# Patient Record
Sex: FEMALE | Race: BLACK OR AFRICAN AMERICAN | NOT HISPANIC OR LATINO | Employment: FULL TIME | URBAN - METROPOLITAN AREA
[De-identification: names, ages, dates, MRNs, and addresses within clinical notes are randomized per-mention and may not be internally consistent; named-entity substitution may affect disease eponyms.]

---

## 2024-02-26 ENCOUNTER — OFFICE VISIT (OUTPATIENT)
Dept: OPHTHALMOLOGY | Facility: CLINIC | Age: 34
End: 2024-02-26
Payer: COMMERCIAL

## 2024-02-26 DIAGNOSIS — Z02.1 PRE-EMPLOYMENT HEALTH SCREENING EXAMINATION: Primary | ICD-10-CM

## 2024-02-26 PROCEDURE — LSREX EMPLOYEE LASER EYE EXAM: Performed by: OPTOMETRIST

## 2024-02-26 RX ORDER — CETIRIZINE HYDROCHLORIDE 10 MG/1
10 TABLET ORAL
COMMUNITY
Start: 2017-02-26

## 2024-02-26 RX ORDER — FERROUS SULFATE 325(65) MG
1 TABLET ORAL
COMMUNITY
Start: 2023-11-29

## 2024-02-26 RX ORDER — EPINEPHRINE 0.3 MG/.3ML
INJECTION SUBCUTANEOUS
COMMUNITY
Start: 2013-05-15

## 2024-02-26 RX ORDER — ALBUTEROL SULFATE 90 UG/1
AEROSOL, METERED RESPIRATORY (INHALATION)
COMMUNITY
Start: 2015-09-22

## 2024-02-26 RX ORDER — PRENATAL VIT 49/IRON FUM/FOLIC 6.75-0.2MG
TABLET ORAL
COMMUNITY

## 2024-02-26 ASSESSMENT — CONF VISUAL FIELD
METHOD: COUNTING FINGERS
OD_SUPERIOR_TEMPORAL_RESTRICTION: 0
OD_SUPERIOR_NASAL_RESTRICTION: 0
OD_NORMAL: 1
OS_SUPERIOR_TEMPORAL_RESTRICTION: 0
OS_INFERIOR_NASAL_RESTRICTION: 0
OD_INFERIOR_NASAL_RESTRICTION: 0
OD_INFERIOR_TEMPORAL_RESTRICTION: 0
OS_SUPERIOR_NASAL_RESTRICTION: 0
OS_INFERIOR_TEMPORAL_RESTRICTION: 0
OS_NORMAL: 1

## 2024-02-26 ASSESSMENT — TONOMETRY
OD_IOP_MMHG: 14
OS_IOP_MMHG: 15
IOP_METHOD: GOLDMANN APPLANATION

## 2024-02-26 ASSESSMENT — VISUAL ACUITY
OS_CC: 20/20
CORRECTION_TYPE: GLASSES
OD_CC: 20/20
METHOD: SNELLEN - LINEAR
OD_CC+: -2

## 2024-02-26 ASSESSMENT — ENCOUNTER SYMPTOMS
NEUROLOGICAL NEGATIVE: 0
EYES NEGATIVE: 0
MUSCULOSKELETAL NEGATIVE: 0
ENDOCRINE NEGATIVE: 0
RESPIRATORY NEGATIVE: 0
CARDIOVASCULAR NEGATIVE: 0
ALLERGIC/IMMUNOLOGIC NEGATIVE: 0
PSYCHIATRIC NEGATIVE: 0
HEMATOLOGIC/LYMPHATIC NEGATIVE: 0
GASTROINTESTINAL NEGATIVE: 0
CONSTITUTIONAL NEGATIVE: 0

## 2024-02-26 ASSESSMENT — SLIT LAMP EXAM - LIDS
COMMENTS: NORMAL
COMMENTS: NORMAL

## 2024-02-26 ASSESSMENT — CUP TO DISC RATIO
OD_RATIO: 0.3
OS_RATIO: 0.3

## 2024-02-26 ASSESSMENT — EXTERNAL EXAM - LEFT EYE: OS_EXAM: NORMAL

## 2024-02-26 ASSESSMENT — EXTERNAL EXAM - RIGHT EYE: OD_EXAM: NORMAL

## 2024-02-26 NOTE — PROGRESS NOTES
Assessment/Plan   Diagnoses and all orders for this visit:  Pre-employment health screening examination  [employer]. Ocular health WNL OU. 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 Monitor prn.

## 2024-04-28 ENCOUNTER — HOSPITAL ENCOUNTER (EMERGENCY)
Facility: HOSPITAL | Age: 34
Discharge: HOME | End: 2024-04-28
Payer: COMMERCIAL

## 2024-04-28 VITALS
RESPIRATION RATE: 16 BRPM | SYSTOLIC BLOOD PRESSURE: 115 MMHG | OXYGEN SATURATION: 99 % | HEART RATE: 76 BPM | DIASTOLIC BLOOD PRESSURE: 79 MMHG
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
## 2024-04-28 PROCEDURE — 99281 EMR DPT VST MAYX REQ PHY/QHP: CPT

## 2024-04-28 PROCEDURE — 99283 EMERGENCY DEPT VISIT LOW MDM: CPT | Performed by: PHYSICIAN ASSISTANT

## 2024-04-28 ASSESSMENT — COLUMBIA-SUICIDE SEVERITY RATING SCALE - C-SSRS
1. IN THE PAST MONTH, HAVE YOU WISHED YOU WERE DEAD OR WISHED YOU COULD GO TO SLEEP AND NOT WAKE UP?: NO
2. HAVE YOU ACTUALLY HAD ANY THOUGHTS OF KILLING YOURSELF?: NO
6. HAVE YOU EVER DONE ANYTHING, STARTED TO DO ANYTHING, OR PREPARED TO DO ANYTHING TO END YOUR LIFE?: NO

## 2024-04-28 NOTE — ED PROVIDER NOTES
Emergency Department Encounter  [facility] EMERGENCY MEDICINE    Patient: Amelia Zendejas  MRN: [id number]  : 1990  Date of Evaluation: 2024  ED Provider: Awilda Anderson PA-C      Chief Complaint       Chief Complaint   Patient presents with    Body Fluid Exposure     HPI    Amelia Zendejas is a 33 y.o. female who presents to the emergency department presenting for needlestick injury.  Patient states that she was stuck with a suture needle just prior to arrival to her left second digit.  She thoroughly cleaned the area prior to arrival.  Reports that the source patient is now on the floor and is available for lab draw.  Denies any known exposures from the source patient.  No associated numbness, tingling, weakness.  Reports her tetanus shot was within the last 10 years.  Is declining empiric postexposure prophylaxis today.    ROS:     Review of Systems  14 systems reviewed and otherwise acutely negative except as in the HPI.    Past History     Past Medical History:   Diagnosis Date    Allergy status to unspecified drugs, medicaments and biological substances 05/15/2013    History of allergy    Chlamydial infection, unspecified 10/07/2014    Chlamydia    Encounter for supervision of other normal pregnancy, unspecified trimester ([facility]) 10/02/2014    Pregnancy, subsequent    Hordeolum externum unspecified eye, unspecified eyelid 2014    Stye    Irregular menstruation, unspecified 10/02/2014    Missed period    Nausea 2013    Nausea    Other problems related to lifestyle 10/02/2014    Other problems related to lifestyle    Personal history of other diseases of the female genital tract 2013    History of vaginitis    Personal history of other diseases of the respiratory system 2013    History of pharyngitis     No past surgical history on file.  Social History     Socioeconomic History    Marital status:      Spouse name: Not on  file    Number of children: Not on file    Years of education: Not on file    Highest education level: Not on file   Occupational History    Not on file   Tobacco Use    Smoking status: Not on file    Smokeless tobacco: Not on file   Substance and Sexual Activity    Alcohol use: Not on file    Drug use: Not on file    Sexual activity: Not on file   Other Topics Concern    Not on file   Social History Narrative    Not on file     Social Determinants of Health     Financial Resource Strain: Not on File (3/10/2023)    Received from [employer]     Financial Resource Strain     Financial Resource Strain: 0   Food Insecurity: Not on File (3/10/2023)    Received from [employer]     Food Insecurity     Food: 0   Transportation Needs: Not on File (3/10/2023)    Received from [employer]     Transportation Needs     Transportation: 0   Physical Activity: Not on File (3/10/2023)    Received from [employer]     Physical Activity     Physical Activity: 0   Stress: Not on File (3/10/2023)    Received from [employer]     Stress     Stress: 0   Social Connections: Not on File (3/10/2023)    Received from [employer]     Social Connections     Social Connections and Isolation: 0   Intimate Partner Violence: Not on file   Housing Stability: Not on File (3/10/2023)    Received from [employer]     Housing Stability     [place]ous[place]       Medications/Allergies     Previous Medications    ALBUTEROL (PROAIR HFA) 90 MCG/ACTUATION INHALER    Inhale.    CETIRIZINE (ZYRTEC) 10 MG TABLET    Take 1 tablet (10 mg) by mouth once daily.    EPINEPHRINE 0.3 MG/0.3 ML INJECTION SYRINGE    INJECT INTRAMUSCULARLY AS DIRECTED.    FERROUS SULFATE, 325 MG FERROUS SULFATE, TABLET    Take 1 tablet by mouth once daily with breakfast.    PRENATAL VIT 49-IRON FUM-FOLIC (MINI PRENATAL) 6.75 MG IRON- 200 MCG TABLET    Take by mouth.     Allergies   Allergen Reactions    Fish Containing Products Anaphylaxis    Peanut Anaphylaxis    Peanut Oil Anaphylaxis    Shellfish Derived Hives and Rash         Physical Exam       ED Triage Vitals   Temp Pulse Resp BP   -- -- -- --      SpO2 Temp src Heart Rate Source Patient Position   -- -- -- --      BP Location FiO2 (%)     -- --         Physical Exam    Physical Exam:    VS: As documented in the triage note and EMR flowsheet from this visit were reviewed.    Appearance: Alert, oriented, cooperative, in no acute distress. Well nourished & well hydrated.    Skin: Warm, intact and dry. No lesions, rash, or petechiae. No streaking from wound site.    Neck: Supple.    Pulmonary: Clear bilaterally with good chest wall excursion. No rales, rhonchi or wheezing. No accessory muscle use or stridor.     Cardiac: Normal S1, S2     Musculoskeletal: Spontaneously moving all extremities without limitation. Extremities warm and well-perfused, capillary refill less than 2 seconds. Pulses full and equal. Full AROM left fingers, wrist.     Neurological: Normal sensation, no weakness. Equal  strength.    Psychiatric: Appropriate mood and affect. Kempt appearance.     Diagnostics   Not applicable      ED Course   There were no vitals taken for this visit.  Medications - No data to display    Medical Decision Making     Diagnoses as of 04/28/24 1131   Needlestick injury accident with exposure to body fluid   Patient presents with benign needlestick to left second digit.  She is declining postexposure prophylaxis.  Needlestick paperwork is filled out and faxed to [employer].  She is instructed to follow-up with employee health tomorrow, at her earliest convenience.  Tetanus has been updated within the past 10 years per the patient, this is deferred today.      Final Impression      1. Needlestick injury accident with exposure to body fluid          DISPOSITION  Disposition: discharge  Patient condition is: Stable    Comment: Please note this report has been produced using speech recognition software and may contain errors related to that system including errors in grammar,  punctuation, and spelling, as well as words and phrases that may be inappropriate.  If there are any questions or concerns please feel free to contact the dictating provider for clarification.    HERNANDEZ Lai PA-C  04/28/24 1136

## 2025-03-30 ENCOUNTER — APPOINTMENT (OUTPATIENT)
Dept: RADIOLOGY | Facility: HOSPITAL | Age: 35
End: 2025-03-30
Payer: COMMERCIAL

## 2025-03-30 ENCOUNTER — HOSPITAL ENCOUNTER (EMERGENCY)
Facility: HOSPITAL | Age: 35
Discharge: HOME | End: 2025-03-30
Payer: COMMERCIAL

## 2025-03-30 VITALS
HEART RATE: 60 BPM | RESPIRATION RATE: 16 BRPM | SYSTOLIC BLOOD PRESSURE: 125 MMHG | TEMPERATURE: 96.3 F | DIASTOLIC BLOOD PRESSURE: 79 MMHG | HEIGHT: 63 IN | BODY MASS INDEX: 27.46 KG/M2 | OXYGEN SATURATION: 100 % | WEIGHT: 155 LBS

## 2025-03-30 DIAGNOSIS — Z34.91 FIRST TRIMESTER PREGNANCY (HHS-HCC): Primary | ICD-10-CM

## 2025-03-30 DIAGNOSIS — O26.899 ABDOMINAL PAIN AFFECTING PREGNANCY (HHS-HCC): ICD-10-CM

## 2025-03-30 DIAGNOSIS — R10.9 ABDOMINAL PAIN AFFECTING PREGNANCY (HHS-HCC): ICD-10-CM

## 2025-03-30 LAB
APPEARANCE UR: CLEAR
B-HCG SERPL-ACNC: ABNORMAL MIU/ML
BILIRUB UR STRIP.AUTO-MCNC: NEGATIVE MG/DL
COLOR UR: COLORLESS
GLUCOSE UR STRIP.AUTO-MCNC: NORMAL MG/DL
HOLD SPECIMEN: NORMAL
KETONES UR STRIP.AUTO-MCNC: NEGATIVE MG/DL
LEUKOCYTE ESTERASE UR QL STRIP.AUTO: NEGATIVE
NITRITE UR QL STRIP.AUTO: NEGATIVE
PH UR STRIP.AUTO: 6 [PH]
PROT UR STRIP.AUTO-MCNC: NEGATIVE MG/DL
RBC # UR STRIP.AUTO: ABNORMAL MG/DL
RBC #/AREA URNS AUTO: NORMAL /HPF
SP GR UR STRIP.AUTO: 1
UROBILINOGEN UR STRIP.AUTO-MCNC: NORMAL MG/DL
WBC #/AREA URNS AUTO: NORMAL /HPF

## 2025-03-30 PROCEDURE — 99284 EMERGENCY DEPT VISIT MOD MDM: CPT

## 2025-03-30 PROCEDURE — 36415 COLL VENOUS BLD VENIPUNCTURE: CPT

## 2025-03-30 PROCEDURE — 76801 OB US < 14 WKS SINGLE FETUS: CPT

## 2025-03-30 PROCEDURE — 76801 OB US < 14 WKS SINGLE FETUS: CPT | Performed by: RADIOLOGY

## 2025-03-30 PROCEDURE — 99284 EMERGENCY DEPT VISIT MOD MDM: CPT | Mod: 25

## 2025-03-30 PROCEDURE — 76817 TRANSVAGINAL US OBSTETRIC: CPT | Performed by: RADIOLOGY

## 2025-03-30 PROCEDURE — 84702 CHORIONIC GONADOTROPIN TEST: CPT

## 2025-03-30 PROCEDURE — 81001 URINALYSIS AUTO W/SCOPE: CPT

## 2025-03-30 ASSESSMENT — PAIN DESCRIPTION - LOCATION: LOCATION: ABDOMEN

## 2025-03-30 ASSESSMENT — PAIN SCALES - GENERAL: PAINLEVEL_OUTOF10: 10 - WORST POSSIBLE PAIN

## 2025-03-30 ASSESSMENT — COLUMBIA-SUICIDE SEVERITY RATING SCALE - C-SSRS
6. HAVE YOU EVER DONE ANYTHING, STARTED TO DO ANYTHING, OR PREPARED TO DO ANYTHING TO END YOUR LIFE?: NO
2. HAVE YOU ACTUALLY HAD ANY THOUGHTS OF KILLING YOURSELF?: NO
1. IN THE PAST MONTH, HAVE YOU WISHED YOU WERE DEAD OR WISHED YOU COULD GO TO SLEEP AND NOT WAKE UP?: NO

## 2025-03-30 ASSESSMENT — PAIN - FUNCTIONAL ASSESSMENT: PAIN_FUNCTIONAL_ASSESSMENT: 0-10

## 2025-03-30 NOTE — Clinical Note
Amelia Zendejas was seen and treated in our emergency department on 3/30/2025.  She may return to work on 03/31/2025.       If you have any questions or concerns, please don't hesitate to call.      Latia Mora PA-C

## 2025-03-30 NOTE — DISCHARGE INSTRUCTIONS
Your hCG was elevated at about 35,000.  Your urine showed no evidence of infection.  The ultrasound we obtained showed an intrauterine pregnancy corresponding to about 6 weeks and 3 days with a fetal heart rate of 117.  This is very reassuring.  Going forward, please reach out to your OB/GYN provider and get an appointment for your first trimester visit in the next few weeks.  If you begin experiencing any worsening pain, headache, dizziness, lightheadedness, syncope, or vaginal bleeding please return immediately.

## 2025-03-30 NOTE — ED PROVIDER NOTES
HPI   Chief Complaint   Patient presents with    Abdominal Pain       Patient is an otherwise healthy  female currently about 8 weeks pregnant with LMP in early February presenting to the ED with abdominal pain.  Patient states she woke up at 4 AM with sharp lower abdominal pain.  She states it is still occurring intermittently.  She denies any vaginal bleeding, but states her symptoms feel similar to her history of a miscarriage.  Patient otherwise states she was in her normal state of health up until her symptoms began at 4 AM.  She denies take anything for her symptoms prior to presenting today.              Patient History   Past Medical History:   Diagnosis Date    Allergy status to unspecified drugs, medicaments and biological substances 05/15/2013    History of allergy    Chlamydial infection, unspecified 10/07/2014    Chlamydia    Encounter for supervision of other normal pregnancy, unspecified trimester ([facility]) 10/02/2014    Pregnancy, subsequent    Hordeolum externum unspecified eye, unspecified eyelid 2014    Stye    Irregular menstruation, unspecified 10/02/2014    Missed period    Nausea 2013    Nausea    Other problems related to lifestyle 10/02/2014    Other problems related to lifestyle    Personal history of other diseases of the female genital tract 2013    History of vaginitis    Personal history of other diseases of the respiratory system 2013    History of pharyngitis     History reviewed. No pertinent surgical history.  No family history on file.  Social History     Tobacco Use    Smoking status: Never    Smokeless tobacco: Never   Substance Use Topics    Alcohol use: Not on file    Drug use: Not on file       Physical Exam   ED Triage Vitals [25 0833]   Temperature Heart Rate Respirations BP   35.7 °C (96.3 °F) 60 16 125/79      Pulse Ox Temp src Heart Rate Source Patient Position   100 % -- -- --      BP Location FiO2 (%)     -- --       Physical  Exam  Vitals reviewed.   Constitutional:       General: She is not in acute distress.     Appearance: She is not ill-appearing.   Cardiovascular:      Rate and Rhythm: Normal rate.   Pulmonary:      Effort: Pulmonary effort is normal. No respiratory distress.      Breath sounds: Normal breath sounds.   Abdominal:      General: Bowel sounds are normal.      Palpations: Abdomen is soft.      Tenderness: There is no abdominal tenderness. There is no guarding or rebound.   Genitourinary:     Comments: Deferred   Neurological:      General: No focal deficit present.      Mental Status: She is alert.           ED Course & MDM   Diagnoses as of 03/30/25 1108   First trimester pregnancy ([facility])   Abdominal pain affecting pregnancy ([facility])     Labs Reviewed   HUMAN CHORIONIC GONADOTROPIN, SERUM QUANTITATIVE - Abnormal       Result Value    HCG, Beta-Quantitative 36,500 (*)     Narrative:     Total HCG measurement is performed using the Siemens WikirinllFarFaria immunoassay which detects intact HCG and free beta HCG subunit.  This test is not indicated for use as a tumor marker.  HCG testing is performed using a different test methodology at [facility] than other [facility]. Direct result comparison  should only be made within the same method.          URINALYSIS WITH REFLEX CULTURE AND MICROSCOPIC - Abnormal    Color, Urine Colorless (*)     Appearance, Urine Clear      Specific Gravity, Urine 1.001 (*)     pH, Urine 6.0      Protein, Urine NEGATIVE      Glucose, Urine Normal      Blood, Urine 0.03 (TRACE) (*)     Ketones, Urine NEGATIVE      Bilirubin, Urine NEGATIVE      Urobilinogen, Urine Normal      Nitrite, Urine NEGATIVE      Leukocyte Esterase, Urine NEGATIVE     URINALYSIS MICROSCOPIC WITH REFLEX CULTURE - Normal    WBC, Urine 1-5      RBC, Urine 1-2     URINALYSIS WITH REFLEX CULTURE AND MICROSCOPIC    Narrative:     The following orders were created for panel order Urinalysis with Reflex Culture  and Microscopic.  Procedure                               Abnormality         Status                     ---------                               -----------         ------                     Urinalysis with Reflex C...[[id number]]  Abnormal            Final result               Extra Urine Gray Tube[[id number]]                            In process                   Please view results for these tests on the individual orders.   EXTRA URINE GRAY TUBE     US PELVIS OB TRANSABDOMINAL W TRANSVAGINAL UP TO 1ST TRIMESTER   Final Result   Single live intrauterine gestation corresponding to 6 weeks, 3 days.   Follow up examination is recommended at 18-20 weeks gestation for   evaluation of fetal anatomy.             I personally reviewed the images/study and I agree with the findings   as stated above by resident physician, Dr. Mir Mcak.        MACRO:   None3        Signed by: Gaston Weaver 3/30/2025 10:25 AM   Dictation workstation:   [id number]                  No data recorded     [place] Coma Scale Score: 15 (25 0841 : Malka Huang RN)                           Medical Decision Making  Patient is an otherwise healthy  female currently about 8 weeks pregnant with LMP in early February presenting to the ED with abdominal pain.  History was obtained from the patient.  Woke up around 4 AM with sharp lower abdominal pain.  Now occurring intermittently.  Denies vaginal bleeding but states her symptoms feel similar to her previous miscarriage.  Otherwise in her normal state of health until the symptoms began.  On physical exam, patient is sitting comfortably and in no apparent distress.  Abdomen soft and nontender with normal bowel sounds.  No rebound or guarding.  Pelvic exam deferred.  Remainder of exam as noted above.  Vital signs are stable.    Differential diagnosis considered includes early pregnancy symptoms, miscarriage, STD, torsion, or TOA.  Quantitative hCG, urinalysis, and  transabdominal transvaginal ultrasound obtained for further evaluation.  Patient declined analgesics.  Urinalysis without evidence of infection.  hCG elevated at 35,500.  Ultrasound revealed a single live IUP corresponding to 6 weeks and 3 days with a fetal heart rate of 117.  Patient was informed these results.  She then remained stable and ready for discharge.  She can take Tylenol as needed for pain.  Otherwise, she states she has an OB/GYN provider with whom she will get in touch with to facilitate first trimester follow-up.  Return precautions otherwise provided.  She is agreeable to the plan and her questions were answered to satisfaction.  She was discharged in stable condition.        Procedure  Procedures     Latia Mora PA-C  03/30/25 1111

## 2025-04-03 ENCOUNTER — APPOINTMENT (OUTPATIENT)
Dept: RADIOLOGY | Facility: HOSPITAL | Age: 35
End: 2025-04-03
Payer: COMMERCIAL

## 2025-04-03 ENCOUNTER — HOSPITAL ENCOUNTER (EMERGENCY)
Facility: HOSPITAL | Age: 35
Discharge: HOME | End: 2025-04-03
Attending: GENERAL PRACTICE
Payer: COMMERCIAL

## 2025-04-03 VITALS
BODY MASS INDEX: 27.46 KG/M2 | WEIGHT: 155 LBS | HEIGHT: 63 IN | OXYGEN SATURATION: 98 % | TEMPERATURE: 99.5 F | RESPIRATION RATE: 18 BRPM | HEART RATE: 75 BPM | DIASTOLIC BLOOD PRESSURE: 70 MMHG | SYSTOLIC BLOOD PRESSURE: 125 MMHG

## 2025-04-03 DIAGNOSIS — M25.571 ACUTE RIGHT ANKLE PAIN: Primary | ICD-10-CM

## 2025-04-03 PROCEDURE — 73700 CT LOWER EXTREMITY W/O DYE: CPT | Mod: RT

## 2025-04-03 PROCEDURE — 73590 X-RAY EXAM OF LOWER LEG: CPT | Mod: RIGHT SIDE | Performed by: STUDENT IN AN ORGANIZED HEALTH CARE EDUCATION/TRAINING PROGRAM

## 2025-04-03 PROCEDURE — 73610 X-RAY EXAM OF ANKLE: CPT | Mod: RIGHT SIDE | Performed by: STUDENT IN AN ORGANIZED HEALTH CARE EDUCATION/TRAINING PROGRAM

## 2025-04-03 PROCEDURE — 73610 X-RAY EXAM OF ANKLE: CPT | Mod: RT

## 2025-04-03 PROCEDURE — 73700 CT LOWER EXTREMITY W/O DYE: CPT | Mod: RIGHT SIDE | Performed by: RADIOLOGY

## 2025-04-03 PROCEDURE — 73590 X-RAY EXAM OF LOWER LEG: CPT | Mod: RT

## 2025-04-03 PROCEDURE — 99284 EMERGENCY DEPT VISIT MOD MDM: CPT | Performed by: GENERAL PRACTICE

## 2025-04-03 ASSESSMENT — PAIN DESCRIPTION - LOCATION: LOCATION: LEG

## 2025-04-03 ASSESSMENT — PAIN DESCRIPTION - ORIENTATION: ORIENTATION: RIGHT

## 2025-04-03 ASSESSMENT — PAIN DESCRIPTION - PAIN TYPE: TYPE: ACUTE PAIN

## 2025-04-03 ASSESSMENT — PAIN DESCRIPTION - DESCRIPTORS: DESCRIPTORS: SHOOTING

## 2025-04-03 ASSESSMENT — PAIN - FUNCTIONAL ASSESSMENT: PAIN_FUNCTIONAL_ASSESSMENT: 0-10

## 2025-04-03 NOTE — ED PROVIDER NOTES
HPI     CC: Leg Pain     HPI: Amelia Zendejas is a 34 y.o. female with no past medical history who is  presents with her  with concern for right ankle injury.  Patient reports that she was playing with the kids when she moved her leg in some way and hit something made of glass nearby.  No broken glass at the site.  She felt immediate pain and did not fall.  She states that her toes feel numb.  Has not taken anything for pain as she is pregnant at approximately 8 weeks gestation.  She reports significant pain in her right ankle up to the distal tib-fib.  No history of surgery in this area.    ROS: 10-point review of systems was performed and is otherwise negative except as noted in HPI.      Past Medical History: Noncontributory except per HPI     Past Surgical History: Noncontributory except per HPI     Family History: Reviewed and noncontributory     Social History:  Noncontributory except per HPI       Allergies   Allergen Reactions    Fish Containing Products Anaphylaxis    Peanut Anaphylaxis    Peanut Oil Anaphylaxis    Shellfish Derived Hives and Rash       Past Medical History:   Diagnosis Date    Allergy status to unspecified drugs, medicaments and biological substances 05/15/2013    History of allergy    Chlamydial infection, unspecified 10/07/2014    Chlamydia    Encounter for supervision of other normal pregnancy, unspecified trimester ([facility]) 10/02/2014    Pregnancy, subsequent    Hordeolum externum unspecified eye, unspecified eyelid 2014    Stye    Irregular menstruation, unspecified 10/02/2014    Missed period    Nausea 2013    Nausea    Other problems related to lifestyle 10/02/2014    Other problems related to lifestyle    Personal history of other diseases of the female genital tract 2013    History of vaginitis    Personal history of other diseases of the respiratory system 2013    History of pharyngitis       Home Meds:   Current Outpatient  Medications   Medication Instructions    albuterol (ProAir HFA) 90 mcg/actuation inhaler inhalation    cetirizine (ZYRTEC) 10 mg, oral, Daily RT    EPINEPHrine 0.3 mg/0.3 mL injection syringe INJECT INTRAMUSCULARLY AS DIRECTED.    ferrous sulfate, 325 mg ferrous sulfate, tablet 1 tablet, oral, Daily with breakfast    prenatal vit 49-iron fum-folic (Mini Prenatal) 6.75 mg iron- 200 mcg tablet oral        ED Triage Vitals [04/03/25 1839]   Temperature Heart Rate Respirations BP   37.5 °C (99.5 °F) 78 20 126/75      Pulse Ox Temp Source Heart Rate Source Patient Position   98 % Temporal -- --      BP Location FiO2 (%)     -- --               Physical Exam:  Physical Exam  Vitals and nursing note reviewed.   Constitutional:       General: She is not in acute distress.     Appearance: Normal appearance. She is not ill-appearing.   HENT:      Head: Normocephalic and atraumatic.      Right Ear: External ear normal.      Left Ear: External ear normal.      Nose: Nose normal.      Mouth/Throat:      Mouth: Mucous membranes are moist.   Eyes:      Extraocular Movements: Extraocular movements intact.      Conjunctiva/sclera: Conjunctivae normal.      Pupils: Pupils are equal, round, and reactive to light.   Cardiovascular:      Rate and Rhythm: Normal rate and regular rhythm.      Pulses: Normal pulses.   Pulmonary:      Effort: Pulmonary effort is normal. No respiratory distress.      Breath sounds: Normal breath sounds.   Abdominal:      General: Abdomen is flat.      Palpations: Abdomen is soft.      Tenderness: There is no abdominal tenderness.   Musculoskeletal:      Cervical back: Normal range of motion and neck supple.      Comments: Patient barely lets me examine her due to significant pain.  Pain appears to be in the anterior portion of the ankle as well as the lateral and medial and extends up to the distal tib-fib.  Small skin abrasion in the area with dried blood.  No obvious foreign body.  2+ DP pulse.  Unable to  attempt range of motion due to patient pain.  No fibular head tenderness.  Calf compartment is soft and compressible.   Skin:     General: Skin is warm and dry.      Capillary Refill: Capillary refill takes less than 2 seconds.   Neurological:      General: No focal deficit present.      Mental Status: She is alert and oriented to person, place, and time.   Psychiatric:         Mood and Affect: Mood normal.          Diagnostic Results        Labs Reviewed - No data to display      CT ankle right wo IV contrast   Final Result   1. No acute osseous abnormality identified. If there is ongoing   concern for occult fracture or ligamentous/soft tissue injury then   MRI may be performed in further assessment.        MACRO:   None        Signed by: Cassius May 4/3/2025 10:22 PM   Dictation workstation:   [id number]      XR ankle right 3+ views   Final Result   No acute osseous abnormality of the right ankle or tibia/fibula.             MACRO:   None.        Signed by: Mati Mosley 4/3/2025 7:53 PM   Dictation workstation:   [id number]      XR tibia fibula right 2 views   Final Result   No acute osseous abnormality of the right ankle or tibia/fibula.             MACRO:   None.        Signed by: Mati Mosley 4/3/2025 7:53 PM   Dictation workstation:   [id number]                    No data recorded                  Procedure  Procedures    ED Course & MDM   Assessment/Plan:     Medications - No data to display     Diagnoses as of 25 0701   Acute right ankle pain       Medical Decision Making    Amelia Zendejas is a 34 y.o. female with no past medical history who is  presents with her  with concern for right ankle injury.  Patient is nontoxic-appearing and vital signs are normal.  Based on symptoms presentation, differential diagnosis includes right ankle fracture dislocation, right tib-fib fracture or dislocation, right foot fracture or dislocation or strains of the above areas.   Will obtain three-view right ankle films and 2 view right tib-fib films to rule out the above.  Patient declines pain medication at this time.  X-rays were negative so patient was reevaluated.  She was still so tender to light touch and unable to move the leg, that we discussed a CT scan to rule out occult fracture, foreign body, or air in the joint from a possible traumatic arthrotomy.  Discussed with CT scan who said that the risk of radiation was very low due to proximity of ankle from baby as well as the ability to shield with the lead.  Patient was given the choice with whether to proceed with walking boot and crutches or CT scan, and she did elect for the CT scan and was able to reiterate the risk to the baby.  CT scan ordered.  Discussed with Dr. Cooper and signed out at this time.  Patient still declining pain medication.    Disposition: TBD    ED Prescriptions    None         Social Determinants Affecting Care: none     Kina Dior PA-C    This note was dictated by speech recognition. Minor errors in transcription may be present.     Kina Dior PA-C  04/03/25 2045       Kina Dior PA-C  04/05/25 0701

## 2025-04-03 NOTE — ED TRIAGE NOTES
Pt coming in today for a right leg injury. She was playing with her kids when he kicked something around her ankle area and now is having intense leg pain. Is unable to put any weight on the right leg.

## 2025-04-03 NOTE — Clinical Note
Amelia Zendejas was seen and treated in our emergency department on 4/3/2025.  She may return to work on 04/04/2025.       If you have any questions or concerns, please don't hesitate to call.      Hugo Cooper, DO

## 2025-04-03 NOTE — LETTER
April 3, 2025    Patient: Amelia Zendejas   YOB: 1990   Date of Visit: 4/3/2025       To Whom It May Concern:    Amelia Zendjeas was seen and treated in our emergency department on 4/3/2025. She may return to work on 4/7/2025    If you have any questions or concerns, please don't hesitate to call.              CC: No Recipients